# Patient Record
Sex: FEMALE | Race: WHITE | NOT HISPANIC OR LATINO | Employment: FULL TIME | URBAN - METROPOLITAN AREA
[De-identification: names, ages, dates, MRNs, and addresses within clinical notes are randomized per-mention and may not be internally consistent; named-entity substitution may affect disease eponyms.]

---

## 2021-08-02 ENCOUNTER — TRANSFERRED RECORDS (OUTPATIENT)
Dept: HEALTH INFORMATION MANAGEMENT | Facility: CLINIC | Age: 46
End: 2021-08-02

## 2021-08-02 LAB — EJECTION FRACTION: NORMAL %

## 2021-09-30 ENCOUNTER — TRANSFERRED RECORDS (OUTPATIENT)
Dept: HEALTH INFORMATION MANAGEMENT | Facility: CLINIC | Age: 46
End: 2021-09-30

## 2023-04-01 ENCOUNTER — TRANSFERRED RECORDS (OUTPATIENT)
Dept: MULTI SPECIALTY CLINIC | Facility: CLINIC | Age: 48
End: 2023-04-01

## 2024-01-26 ENCOUNTER — OFFICE VISIT (OUTPATIENT)
Dept: FAMILY MEDICINE | Facility: CLINIC | Age: 49
End: 2024-01-26
Payer: COMMERCIAL

## 2024-01-26 ENCOUNTER — TELEPHONE (OUTPATIENT)
Dept: CARDIOLOGY | Facility: CLINIC | Age: 49
End: 2024-01-26

## 2024-01-26 VITALS
HEIGHT: 64 IN | DIASTOLIC BLOOD PRESSURE: 86 MMHG | OXYGEN SATURATION: 96 % | SYSTOLIC BLOOD PRESSURE: 136 MMHG | HEART RATE: 76 BPM | WEIGHT: 178 LBS | BODY MASS INDEX: 30.39 KG/M2 | RESPIRATION RATE: 16 BRPM | TEMPERATURE: 97.5 F

## 2024-01-26 DIAGNOSIS — I42.1 HOCM (HYPERTROPHIC OBSTRUCTIVE CARDIOMYOPATHY) (H): Primary | ICD-10-CM

## 2024-01-26 DIAGNOSIS — H40.9 GLAUCOMA OF BOTH EYES, UNSPECIFIED GLAUCOMA TYPE: ICD-10-CM

## 2024-01-26 DIAGNOSIS — Z00.00 ROUTINE GENERAL MEDICAL EXAMINATION AT A HEALTH CARE FACILITY: Primary | ICD-10-CM

## 2024-01-26 DIAGNOSIS — E03.9 HYPOTHYROIDISM, UNSPECIFIED TYPE: ICD-10-CM

## 2024-01-26 DIAGNOSIS — I42.1 HOCM (HYPERTROPHIC OBSTRUCTIVE CARDIOMYOPATHY) (H): ICD-10-CM

## 2024-01-26 DIAGNOSIS — Z12.11 COLON CANCER SCREENING: ICD-10-CM

## 2024-01-26 DIAGNOSIS — Z12.31 ENCOUNTER FOR SCREENING MAMMOGRAM FOR BREAST CANCER: ICD-10-CM

## 2024-01-26 LAB
ALBUMIN SERPL BCG-MCNC: 4.3 G/DL (ref 3.5–5.2)
ALP SERPL-CCNC: 79 U/L (ref 40–150)
ALT SERPL W P-5'-P-CCNC: 16 U/L (ref 0–50)
ANION GAP SERPL CALCULATED.3IONS-SCNC: 8 MMOL/L (ref 7–15)
AST SERPL W P-5'-P-CCNC: 21 U/L (ref 0–45)
BASOPHILS # BLD AUTO: 0 10E3/UL (ref 0–0.2)
BASOPHILS NFR BLD AUTO: 1 %
BILIRUB SERPL-MCNC: 0.3 MG/DL
BUN SERPL-MCNC: 21.7 MG/DL (ref 6–20)
CALCIUM SERPL-MCNC: 9.5 MG/DL (ref 8.6–10)
CHLORIDE SERPL-SCNC: 108 MMOL/L (ref 98–107)
CHOLEST SERPL-MCNC: 239 MG/DL
CREAT SERPL-MCNC: 0.86 MG/DL (ref 0.51–0.95)
DEPRECATED HCO3 PLAS-SCNC: 27 MMOL/L (ref 22–29)
EGFRCR SERPLBLD CKD-EPI 2021: 83 ML/MIN/1.73M2
EOSINOPHIL # BLD AUTO: 0.1 10E3/UL (ref 0–0.7)
EOSINOPHIL NFR BLD AUTO: 2 %
ERYTHROCYTE [DISTWIDTH] IN BLOOD BY AUTOMATED COUNT: 12.8 % (ref 10–15)
FASTING STATUS PATIENT QL REPORTED: YES
GLUCOSE SERPL-MCNC: 92 MG/DL (ref 70–99)
HBA1C MFR BLD: 5.1 % (ref 0–5.6)
HCT VFR BLD AUTO: 44.9 % (ref 35–47)
HDLC SERPL-MCNC: 63 MG/DL
HGB BLD-MCNC: 15 G/DL (ref 11.7–15.7)
IMM GRANULOCYTES # BLD: 0 10E3/UL
IMM GRANULOCYTES NFR BLD: 0 %
LDLC SERPL CALC-MCNC: 153 MG/DL
LYMPHOCYTES # BLD AUTO: 1.6 10E3/UL (ref 0.8–5.3)
LYMPHOCYTES NFR BLD AUTO: 28 %
MCH RBC QN AUTO: 30.2 PG (ref 26.5–33)
MCHC RBC AUTO-ENTMCNC: 33.4 G/DL (ref 31.5–36.5)
MCV RBC AUTO: 90 FL (ref 78–100)
MONOCYTES # BLD AUTO: 0.4 10E3/UL (ref 0–1.3)
MONOCYTES NFR BLD AUTO: 7 %
NEUTROPHILS # BLD AUTO: 3.4 10E3/UL (ref 1.6–8.3)
NEUTROPHILS NFR BLD AUTO: 62 %
NONHDLC SERPL-MCNC: 176 MG/DL
PLATELET # BLD AUTO: 242 10E3/UL (ref 150–450)
POTASSIUM SERPL-SCNC: 4.9 MMOL/L (ref 3.4–5.3)
PROT SERPL-MCNC: 7.2 G/DL (ref 6.4–8.3)
RBC # BLD AUTO: 4.97 10E6/UL (ref 3.8–5.2)
SODIUM SERPL-SCNC: 143 MMOL/L (ref 135–145)
T4 FREE SERPL-MCNC: 0.9 NG/DL (ref 0.9–1.7)
TRIGL SERPL-MCNC: 115 MG/DL
TSH SERPL DL<=0.005 MIU/L-ACNC: 6.16 UIU/ML (ref 0.3–4.2)
WBC # BLD AUTO: 5.5 10E3/UL (ref 4–11)

## 2024-01-26 PROCEDURE — 84443 ASSAY THYROID STIM HORMONE: CPT | Performed by: FAMILY MEDICINE

## 2024-01-26 PROCEDURE — 80061 LIPID PANEL: CPT | Performed by: FAMILY MEDICINE

## 2024-01-26 PROCEDURE — 36415 COLL VENOUS BLD VENIPUNCTURE: CPT | Performed by: FAMILY MEDICINE

## 2024-01-26 PROCEDURE — 99386 PREV VISIT NEW AGE 40-64: CPT | Performed by: FAMILY MEDICINE

## 2024-01-26 PROCEDURE — 85025 COMPLETE CBC W/AUTO DIFF WBC: CPT | Performed by: FAMILY MEDICINE

## 2024-01-26 PROCEDURE — 99213 OFFICE O/P EST LOW 20 MIN: CPT | Mod: 25 | Performed by: FAMILY MEDICINE

## 2024-01-26 PROCEDURE — 80053 COMPREHEN METABOLIC PANEL: CPT | Performed by: FAMILY MEDICINE

## 2024-01-26 PROCEDURE — 84439 ASSAY OF FREE THYROXINE: CPT | Performed by: FAMILY MEDICINE

## 2024-01-26 PROCEDURE — 83036 HEMOGLOBIN GLYCOSYLATED A1C: CPT | Performed by: FAMILY MEDICINE

## 2024-01-26 RX ORDER — LATANOPROST 50 UG/ML
1 SOLUTION/ DROPS OPHTHALMIC
COMMUNITY
Start: 2022-04-27

## 2024-01-26 RX ORDER — CHLORAL HYDRATE 500 MG
CAPSULE ORAL 2 TIMES DAILY
COMMUNITY

## 2024-01-26 RX ORDER — BUSPIRONE HYDROCHLORIDE 15 MG/1
15 TABLET ORAL
COMMUNITY
Start: 2023-07-10 | End: 2024-04-15

## 2024-01-26 RX ORDER — BUPROPION HYDROCHLORIDE 300 MG/1
TABLET ORAL
COMMUNITY
Start: 2023-12-29 | End: 2024-04-15

## 2024-01-26 RX ORDER — MULTIVITAMIN
1 TABLET ORAL DAILY
COMMUNITY

## 2024-01-26 RX ORDER — CETIRIZINE HYDROCHLORIDE 10 MG/1
TABLET ORAL
COMMUNITY

## 2024-01-26 RX ORDER — LEVOTHYROXINE SODIUM 88 UG/1
TABLET ORAL
COMMUNITY
Start: 2022-08-15 | End: 2024-01-26

## 2024-01-26 RX ORDER — LEVOTHYROXINE SODIUM 88 UG/1
TABLET ORAL
Qty: 90 TABLET | Refills: 1 | Status: SHIPPED | OUTPATIENT
Start: 2024-01-26 | End: 2024-08-28

## 2024-01-26 RX ORDER — VERAPAMIL HYDROCHLORIDE 120 MG/1
1 TABLET, FILM COATED, EXTENDED RELEASE ORAL AT BEDTIME
COMMUNITY
Start: 2022-09-12 | End: 2024-04-15

## 2024-01-26 RX ORDER — OMEGA-3S/DHA/EPA/FISH OIL/D3 300MG-1000
10 CAPSULE ORAL
COMMUNITY

## 2024-01-26 RX ORDER — ASCORBIC ACID 125 MG
TABLET,CHEWABLE ORAL
COMMUNITY

## 2024-01-26 ASSESSMENT — ENCOUNTER SYMPTOMS
HEADACHES: 0
DIARRHEA: 0
EYE PAIN: 0
CONSTIPATION: 0
PARESTHESIAS: 0
ABDOMINAL PAIN: 0
WEAKNESS: 0
HEMATURIA: 0
COUGH: 0
CHILLS: 0
HEMATOCHEZIA: 0
DYSURIA: 0
PALPITATIONS: 0
FEVER: 0
ARTHRALGIAS: 0
NAUSEA: 0
SORE THROAT: 0
JOINT SWELLING: 0
NERVOUS/ANXIOUS: 0
DIZZINESS: 0
HEARTBURN: 0
FREQUENCY: 0
MYALGIAS: 0
SHORTNESS OF BREATH: 0

## 2024-01-26 ASSESSMENT — PAIN SCALES - GENERAL: PAINLEVEL: NO PAIN (0)

## 2024-01-26 NOTE — TELEPHONE ENCOUNTER
[facility] Call Center    Phone Message    May a detailed message be left on voicemail: no     Reason for Call: Appointment Intake    Referring Provider Name: Kenya Cain MD in [facility]     Diagnosis and/or Symptoms:     HOCM (hypertrophic obstructive cardiomyopathy)     Please call pt to schedule.     Action Taken: Message routed to:  [facility] cardio    Travel Screening: Not Applicable

## 2024-01-26 NOTE — PROGRESS NOTES
"Preventive Care Visit  [facility]  Kenya Cain MD, Family Medicine  Jan 26, 2024       SUBJECTIVE:   Lawanda is a 48 year old, presenting for the following:  Physical        1/26/2024     6:59 AM   Additional Questions   Roomed by Luisa LORA   Accompanied by Self     Healthy Habits:     Getting at least 3 servings of Calcium per day:  Yes    Bi-annual eye exam:  Yes    Dental care twice a year:  Yes    Sleep apnea or symptoms of sleep apnea:  None    Diet:  Carbohydrate counting    Frequency of exercise:  2-3 days/week    Duration of exercise:  15-30 minutes    Taking medications regularly:  Yes    Medication side effects:  None    Additional concerns today:  Yes    Preventive -     Immunization History   Administered Date(s) Administered    TDAP (Adacel,Boostrix) 12/04/2013       -Mammogram: had mammogram in 05/2023   Next 05/2024   Referred    -Contraception: had tubal ligation     -PAP:   Used to see obgyn   More than 5 years  No hx of abnormal pap  She will schedule Pap at a later time.    No results found for: \"PAP\"    -DEXA: at age 65     - Colon CA screen: Colonoscopy, age 45-75 every 10 years or FIT every year or Cologuard every 3 years     Never had colonoscopy   Referred           -lipids screen: ordered     Diabetes screen: ordered              Obstructive hypertrophic cardiomyopathy (HCC)   Currently taking verapamil CR (Calan SR) 120 MG   Follows with Cardiology     Anxiety   Depression   Wellbutrin 300 mg daily   Buspar 15 mg 2 times daily     Glaucoma   Follows with ophthalmology   Had glaucoma surgery      Hypothyroidism   Synthroid 88 mcg     Wt Readings from Last 4 Encounters:   01/26/24 80.7 kg (178 lb)        Migraine without aura and without status migrainosus, not intractable   Nurtec 75 mg for preventive , has not been taking -  Imitrex 25 mg  as needed   Follows with nueology         Today's PHQ-2 Score:       1/26/2024     6:57 AM   PHQ-2 ( 1999 [employer])   Q1: " Little interest or pleasure in doing things 0   Q2: Feeling down, depressed or hopeless 1   PHQ-2 Score 1   Q1: Little interest or pleasure in doing things Not at all   Q2: Feeling down, depressed or hopeless Several days   PHQ-2 Score 1           Via the Health Maintenance questionnaire, the patient has reported the following services have been completed -Mammogram, this information has been sent to the abstraction team.        Have you ever done Advance Care Planning? (For example, a Health Directive, POLST, or a discussion with a medical provider or your loved ones about your wishes): No, advance care planning information given to patient to review.  Patient plans to discuss their wishes with loved ones or provider.      Social History     Tobacco Use    Smoking status: Never    Smokeless tobacco: Never   Substance Use Topics    Alcohol use: Not on file             2024     6:56 AM   Alcohol Use   Prescreen: >3 drinks/day or >7 drinks/week? Not Applicable       SH:    Marital status:    Kids: 2  Employment:    Exercise: walking 5 days per week   Tobacco: no   Etoh: no   Recreational drugs: no   Caffeine: coffee   Moved for [place] in  to [place] , now moved to [place]       Reviewed orders with patient.  Reviewed health maintenance and updated orders accordingly - Yes  Lab work is in process  Labs reviewed in EPIC  BP Readings from Last 3 Encounters:   24 136/86    Wt Readings from Last 3 Encounters:   24 80.7 kg (178 lb)                  There is no problem list on file for this patient.    Past Surgical History:   Procedure Laterality Date    breast reduction       SECTION      CHOLECYSTECTOMY      ELBOW SURGERY      LAPAROSCOPIC GASTRIC SLEEVE         Social History     Tobacco Use    Smoking status: Never    Smokeless tobacco: Never   Substance Use Topics    Alcohol use: Not on file     Family History   Problem Relation Age of Onset    Breast Cancer  Mother     Lung Cancer Mother     Kidney Disease Father          Current Outpatient Medications   Medication Sig Dispense Refill    buPROPion (WELLBUTRIN XL) 300 MG 24 hr tablet TAKE 1 TABLET BY MOUTH DAILY FOR ANXIETY WITH DEPRESSION OR MAJOR DEPRESSION      busPIRone (BUSPAR) 15 MG tablet Take 15 mg by mouth      cetirizine (ZYRTEC) 10 MG tablet Take by mouth 1 time per day      Cyanocobalamin (B-12) 5000 MCG CAPS       fish oil-omega-3 fatty acids 1000 MG capsule Take  by mouth.      latanoprost (XALATAN) 0.005 % ophthalmic solution Apply 1 drop to eye      levothyroxine (SYNTHROID/LEVOTHROID) 88 MCG tablet TAKE 1 TABLET BY MOUTH DAILY FOR HYPOTHYROIDISM 90 tablet 1    multivitamin w/minerals (MULTI-VITAMIN) tablet Take 1 tablet by mouth daily      verapamil ER (CALAN-SR) 120 MG CR tablet Take 1 tablet by mouth at bedtime      Vitamin D3 (VITAMIN D) 10 MCG (400 UNIT) tablet Take 10 mcg by mouth       Allergies   Allergen Reactions    Bee Venom Hives and Unknown    Benzoin Hives, Other (See Comments) and Rash     Blisters    Insect Extract Hives     Allergic to Bee's.    Phentermine-Topiramate Other (See Comments)     CP24   Parethesia    CP24    Parethesia    Adhesive Tape Blisters and Other (See Comments)     Liquid adhesive    Wound Dressing Adhesive      Liquid adhesive    MISC    Seasonal Allergies Other (See Comments)     Sinus issues and eyes itch and burn     Recent Labs   Lab Test 01/26/24  0741   A1C 5.1        Breast Cancer Screening:  Any new diagnosis of family breast, ovarian, or bowel cancer? Yes       FHS-7:       1/26/2024     6:59 AM   Breast CA Risk Assessment (FHS-7)   Did any of your first-degree relatives have breast or ovarian cancer? Yes   Did any of your relatives have bilateral breast cancer? Unknown   Did any man in your family have breast cancer? No   Did any woman in your family have breast and ovarian cancer? Yes   Did any woman in your family have breast cancer before age 50 y? No  "  Do you have 2 or more relatives with breast and/or ovarian cancer? Unknown   Do you have 2 or more relatives with breast and/or bowel cancer? No     click delete button to remove this line now  Mammogram Screening: Recommended annual mammography  Pertinent mammograms are reviewed under the imaging tab.    History of abnormal Pap smear: NO - age 30-65 PAP every 5 years with negative HPV co-testing recommended     Reviewed and updated as needed this visit by clinical staff   Tobacco  Allergies  Meds   Med Hx  Surg Hx  Fam Hx          Reviewed and updated as needed this visit by Provider     Meds   Med Hx  Surg Hx  Fam Hx          Past Medical History:   Diagnosis Date    Anxiety and depression     HOCM (hypertrophic obstructive cardiomyopathy) (H)     Hypothyroidism     Migraine       Past Surgical History:   Procedure Laterality Date    breast reduction       SECTION      CHOLECYSTECTOMY      ELBOW SURGERY      LAPAROSCOPIC GASTRIC SLEEVE       OB History   No obstetric history on file.     Review of Systems   Constitutional:  Negative for chills and fever.   HENT:  Negative for congestion, ear pain, hearing loss and sore throat.    Eyes:  Negative for pain and visual disturbance.   Respiratory:  Negative for cough and shortness of breath.    Cardiovascular:  Negative for chest pain and palpitations.   Gastrointestinal:  Negative for abdominal pain, constipation, diarrhea and nausea.   Genitourinary:  Negative for dysuria, frequency, genital sores, hematuria and urgency.   Musculoskeletal:  Negative for arthralgias, joint swelling and myalgias.   Skin:  Negative for rash.   Neurological:  Negative for dizziness, weakness and headaches.   Psychiatric/Behavioral:  The patient is not nervous/anxious.          OBJECTIVE:   /86   Pulse 76   Temp 97.5  F (36.4  C) (Tympanic)   Resp 16   Ht 1.626 m (5' 4\")   Wt 80.7 kg (178 lb)   LMP  (LMP Unknown)   SpO2 96%   BMI 30.55 kg/m     Estimated " "body mass index is 30.55 kg/m  as calculated from the following:    Height as of this encounter: 1.626 m (5' 4\").    Weight as of this encounter: 80.7 kg (178 lb).  Physical Exam  GENERAL: alert and no distress  NECK: no adenopathy, no asymmetry, masses, or scars  RESP: lungs clear to auscultation - no rales, rhonchi or wheezes  CV: regular rate and rhythm, normal S1 S2, no S3 or S4, no murmur, click or rub, no peripheral edema  ABDOMEN: soft, nontender, no hepatosplenomegaly, no masses and bowel sounds normal  MS: no gross musculoskeletal defects noted, no edema        ASSESSMENT/PLAN:   Routine general medical examination at a health care facility  New to our clinic - here to establish care   Preventive care reviewed and updated.    - Lipid panel reflex to direct LDL Non-fasting; Future  - Hemoglobin A1c; Future  - Comprehensive metabolic panel; Future  - CBC with Platelets & Differential; Future  - Lipid panel reflex to direct LDL Non-fasting  - Hemoglobin A1c  - Comprehensive metabolic panel  - CBC with Platelets & Differential    Hypothyroidism, unspecified type  History of hypothyroidism, currently on Synthroid 88 mcg.  No current symptoms.  Continue the same treatment, refill provided.  Check TSH today.  - TSH with free T4 reflex; Future  - levothyroxine (SYNTHROID/LEVOTHROID) 88 MCG tablet; TAKE 1 TABLET BY MOUTH DAILY FOR HYPOTHYROIDISM  - TSH with free T4 reflex    HOCM (hypertrophic obstructive cardiomyopathy) (H)  Patient reports she is feeling at baseline.  She denies any shortness of breath or chest pain.  She would like to establish care with a local cardiologist in the area.  Referral placed.  Continue the same treatment for now verapamil 120 mg daily  - Adult Cardiology Eval  Referral; Future    Glaucoma of both eyes, unspecified glaucoma type  She would like to establish care with an eye doctor, referral placed.  - Adult Eye  Referral; Future    Encounter for screening mammogram " for breast cancer    - *MA Screening Digital Bilateral; Future    Colon cancer screening    - Colonoscopy Screening  Referral; Future    Patient has been advised of split billing requirements and indicates understanding: Yes      Counseling  Reviewed preventive health counseling, as reflected in patient instructions       Regular exercise       Healthy diet/nutrition       Colorectal Cancer Screening        She reports that she has never smoked. She has never used smokeless tobacco.          Signed Electronically by: Kenya Cain MD

## 2024-01-31 ENCOUNTER — TELEPHONE (OUTPATIENT)
Dept: GASTROENTEROLOGY | Facility: CLINIC | Age: 49
End: 2024-01-31
Payer: COMMERCIAL

## 2024-01-31 ENCOUNTER — HOSPITAL ENCOUNTER (OUTPATIENT)
Facility: AMBULATORY SURGERY CENTER | Age: 49
End: 2024-01-31
Attending: SURGERY | Admitting: SURGERY
Payer: COMMERCIAL

## 2024-01-31 NOTE — TELEPHONE ENCOUNTER
"Endoscopy Scheduling Screen    Have you had a positive Covid test in the last 14 days?  No    Are you active on MyChart?   Yes    What insurance is in the chart?  Other:  blue plus    Ordering/Referring Provider: RACHEL SANDERS    (If ordering provider performs procedure, schedule with ordering provider unless otherwise instructed. )    BMI: Estimated body mass index is 30.55 kg/m  as calculated from the following:    Height as of 1/26/24: 1.626 m (5' 4\").    Weight as of 1/26/24: 80.7 kg (178 lb).     Sedation Ordered  moderate sedation.   If patient BMI > 50 do not schedule in ASC.    If patient BMI > 45 do not schedule at [facility].    Are you taking methadone or Suboxone?  No    Have you had difficulties, pain, or discomfort during past endoscopy procedures?  No    Are you taking any prescription medications for pain 3 or more times per week?   NO - No RN review required.    Do you have a history of malignant hyperthermia or adverse reaction to anesthesia?  No    (Females) Are you currently pregnant?   No     Have you been diagnosed or told you have pulmonary hypertension?   No    Do you have an LVAD?  No    Have you been told you have moderate to severe sleep apnea?  No    Have you been told you have COPD, asthma, or any other lung disease?  No    Do you have any heart conditions?  Yes     In the past year, have you had any hospitalizations for heart related issues including cardiomyopathy, heart attack, or stent placement?  No    Do you have any implantable devices in your body (pacemaker, ICD)?  No    Do you take nitroglycerine?  No    Have you ever had an organ transplant?   No    Have you ever had or are you awaiting a heart or lung transplant?   No    Have you had a stroke or transient ischemic attack (TIA aka \"mini stroke\" in the last 6 months?   No    Have you been diagnosed with or been told you have cirrhosis of the liver?   No    Are you currently on dialysis?   No    Do you need assistance " "transferring?   No    BMI: Estimated body mass index is 30.55 kg/m  as calculated from the following:    Height as of 1/26/24: 1.626 m (5' 4\").    Weight as of 1/26/24: 80.7 kg (178 lb).     Is patients BMI > 40 and scheduling location [facility]?  No    Do you take an injectable medication for weight loss or diabetes (excluding insulin)?  No    Do you take the medication Naltrexone?  No    Do you take blood thinners?  No       Prep   Are you currently on dialysis or do you have chronic kidney disease?  No    Do you have a diagnosis of diabetes?  No    Do you have a diagnosis of cystic fibrosis (CF)?  No    On a regular basis do you go 3 -5 days between bowel movements?  No    BMI > 40?  No    Preferred Pharmacy:    [id number] - [place] - [id number]  AT [place] [address] [place] [id number]  Phone: [phone] Fax: [phone]      Final Scheduling Details   Colonoscopy prep sent?  Standard MiraLAX    Procedure scheduled  Colonoscopy    Surgeon:  arcadio     Date of procedure:  4/19/24     Pre-OP / PAC:   No - Not required for this site.    Location  [facility] - Per order.    Sedation   Moderate Sedation - Per order.      Patient Reminders:   You will receive a call from a Nurse to review instructions and health history.  This assessment must be completed prior to your procedure.  Failure to complete the Nurse assessment may result in the procedure being cancelled.      On the day of your procedure, please designate an adult(s) who can drive you home stay with you for the next 24 hours. The medicines used in the exam will make you sleepy. You will not be able to drive.      You cannot take public transportation, ride share services, or non-medical taxi service without a responsible caregiver.  Medical transport services are allowed with the requirement that a responsible caregiver will receive you at your destination.  We require that drivers " and caregivers are confirmed prior to your procedure.

## 2024-02-05 ENCOUNTER — TELEPHONE (OUTPATIENT)
Dept: GASTROENTEROLOGY | Facility: CLINIC | Age: 49
End: 2024-02-05
Payer: COMMERCIAL

## 2024-02-05 NOTE — TELEPHONE ENCOUNTER
Caller: Lawanda    Reason for Reschedule/Cancellation (please be detailed, any staff messages or encounters to note?): Needs new date due to ride availability       Prior to reschedule please review:  Ordering Provider: RACHEL SANDERS   Sedation Determined: Moderate  Does patient have any ASC Exclusions, please identify?: No      Notes on Cancelled Procedure:  Procedure: Lower Endoscopy [Colonoscopy]   Date: 04/19/2024  Location: [facility]; [address], [place] [address]  Surgeon: EDWARD      Rescheduled: Yes  Procedure: Lower Endoscopy [Colonoscopy]   Date: 04/12/2024  Location: [facility][note 3]; [address] , [place] [address]  Surgeon: BYRON  Sedation Level Scheduled  MAC,  Reason for Sedation Level Per Location  Prep/Instructions updated and sent: Yes, Carlita

## 2024-02-06 NOTE — TELEPHONE ENCOUNTER
Date: 2/6/2024    Time of Call: 1:42 PM     Diagnosis:  HOCM     [ TORB ] Ordering provider: Dr. Garcia  Order: Establish care with Dr. Garcia with echo and 14 day Ziopatch monitor prior      Order received by: John Kelly RN      Follow-up/additional notes: Called and left voicemail for patient to return call regarding new referral for [facility]. Sent to scheduling.       Referred by Kenya Briggs   Clinic Notes from 1/26/24  ASSESSMENT/PLAN:   Routine general medical examination at a health care facility  New to our clinic - here to establish care   Preventive care reviewed and updated.     - Lipid panel reflex to direct LDL Non-fasting; Future  - Hemoglobin A1c; Future  - Comprehensive metabolic panel; Future  - CBC with Platelets & Differential; Future  - Lipid panel reflex to direct LDL Non-fasting  - Hemoglobin A1c  - Comprehensive metabolic panel  - CBC with Platelets & Differential     Hypothyroidism, unspecified type  History of hypothyroidism, currently on Synthroid 88 mcg.  No current symptoms.  Continue the same treatment, refill provided.  Check TSH today.  - TSH with free T4 reflex; Future  - levothyroxine (SYNTHROID/LEVOTHROID) 88 MCG tablet; TAKE 1 TABLET BY MOUTH DAILY FOR HYPOTHYROIDISM  - TSH with free T4 reflex     HOCM (hypertrophic obstructive cardiomyopathy) (H)  Patient reports she is feeling at baseline.  She denies any shortness of breath or chest pain.  She would like to establish care with a local cardiologist in the area.  Referral placed.  Continue the same treatment for now verapamil 120 mg daily  - Adult Cardiology Eval  Referral; Future     Glaucoma of both eyes, unspecified glaucoma type  She would like to establish care with an eye doctor, referral placed.  - Adult Eye  Referral; Future     Encounter for screening mammogram for breast cancer     - *MA Screening Digital Bilateral; Future     Colon cancer screening     - Colonoscopy Screening   Referral; Future    Prior Testing:   *Stress Echo on 6/22/23 - Images requested   Interpretation Summary   Stress echocardiogram obtained for hypertrophic cardiomyopathy.     Significant inducible LVOT gradient post-exercise was noted.   The resting left ventricular outflow peak gradient is 13 mm Hg. (Peak V : 1.8 m/sec)   The inducible left ventricular outflow peak gradient (Valsalva) is 60 mm Hg. (Peak V : 3.9 m/sec)   The inducible left ventricular outflow peak gradient (post-exercise) is 171 mmHg (Peak V : 6.5 m/sec)   No regional wall motion abnormalities and normal hyperdynamic function noted with stress.   Average exercise tolerance (exercised for 7:32 min to Stage III of the Holger protocol, achieving 8 METS) and stopped due to fatigue.   Baseline EKG with normal sinus rhythm and no diagnostic ST changes with stress.   Kendrick treadmill score +7 (low risk).     Baseline limited echo demonstrates moderate asymmetric LVH. Septal thickness is 1.5 cm.   There is evidence of LVOT obstruction due to systolic anterior motion of the mitral valve.   Trace mitral regurgitation noted.   Resting and inducible LVOT gradients as noted above.   No prior for comparison.     *Cardiac MRI from 9/30/21-  Images requested   IMPRESSION:    1.  Hypertrophic obstructive cardiomyopathy.  The basal anteroseptal wall measures up to 15 mm.  There is systolic anterior motion of the mitral valve.  There is papillary muscle hypertrophy.   2. No late gadolinium enhancement seen.    3. No right ventricular cavity size, wall thickness and global systolic function.  Calculated RVEF: 63%.   4. Small hiatal hernia.     *Echo 8/2/21 - Images requsted  There is moderate concentric left ventricular hypertrophy.   Considering 2D visualization and technical calculations the left ventricular ejection   fraction estimate is 60-65%.   No regional wall motion abnormalities noted.   There is no evidence of aortic stenosis.   The inferior vena cava is normal  in size with respiratory collapse, indicating a right   atrial pressure of approximately 3 mmHg.   There is no evidence of a pericardial effusion.   Dynamic LVOT gradient is noted, up to 70mmHg of mercury with valsalva. There is systolic   anterior motion of the mitral chordal apparatus.

## 2024-02-07 ENCOUNTER — DOCUMENTATION ONLY (OUTPATIENT)
Dept: CARDIOLOGY | Facility: CLINIC | Age: 49
End: 2024-02-07
Payer: COMMERCIAL

## 2024-02-07 NOTE — PROGRESS NOTES
Action 2-7   [facility] Requested:   Action Taken Stress ECHO Images 6-22-23      2-9 Images in PACS     Action 2-7 [facility] Requested:  Tracking # [id number]   Action Taken Cardiac MRI Images  ECHO Images 9-30-21 8-2-21      2-9 Request re-processed: [facility] Pushed to [employer] but [place] does not use this program. Request confirmed with [facility] radiology to send disk like originally requested.    Received disc with cMRI and echo. Sent to 4 N to be uploaded to PACS 2/14 CJ

## 2024-03-08 NOTE — TELEPHONE ENCOUNTER
Pt called LVM asking if she has to repeat a zio. Pt stated she did one a while back and she is highly allergic to the adhesive. Pt would like a call back to discuss.

## 2024-03-13 ENCOUNTER — TELEPHONE (OUTPATIENT)
Dept: CARDIOLOGY | Facility: CLINIC | Age: 49
End: 2024-03-13
Payer: COMMERCIAL

## 2024-03-13 DIAGNOSIS — I42.1 HOCM (HYPERTROPHIC OBSTRUCTIVE CARDIOMYOPATHY) (H): Primary | ICD-10-CM

## 2024-03-13 NOTE — TELEPHONE ENCOUNTER
Pt called RN with information on a heart monitor she had worn a year ago. She stated that the monitor information was viewable in her chart.

## 2024-03-13 NOTE — TELEPHONE ENCOUNTER
Called and LVM for patient to call back to discuss alternative options for a heart monitor with hypoallergenic adhesive.

## 2024-03-29 ENCOUNTER — TELEPHONE (OUTPATIENT)
Dept: GASTROENTEROLOGY | Facility: CLINIC | Age: 49
End: 2024-03-29
Payer: COMMERCIAL

## 2024-03-29 NOTE — TELEPHONE ENCOUNTER
Caller: Lawanda    Reason for Reschedule/Cancellation   (please be detailed, any staff messages or encounters to note?): Covid      Prior to reschedule please review:  Ordering Provider: Kenya Cain  Sedation Determined: MAC  Does patient have any ASC Exclusions, please identify?: N      Notes on Cancelled Procedure:  Procedure: Lower Endoscopy [Colonoscopy]   Date: 4/12/24  Location: [facility] [place] [facility]; [address] [place] [address]  Surgeon: Matt      Rescheduled: Yes,   Procedure: Lower Endoscopy [Colonoscopy]    Date: 5/24/24   Location: [facility] [place] [facility]; [address] [place] [address] [place] [address]   Surgeon: Matt   Sedation Level Scheduled  MAC ,  Reason for Sedation Level Location   Instructions updated and sent: Princess     Does patient need PAC or Pre -Op Rescheduled? : N       Did you cancel or rescheduled an EUS procedure? No.

## 2024-04-09 ENCOUNTER — HOSPITAL ENCOUNTER (OUTPATIENT)
Dept: CARDIOLOGY | Facility: CLINIC | Age: 49
Discharge: HOME | End: 2024-04-09
Attending: INTERNAL MEDICINE | Admitting: INTERNAL MEDICINE
Payer: COMMERCIAL

## 2024-04-09 DIAGNOSIS — I42.1 HOCM (HYPERTROPHIC OBSTRUCTIVE CARDIOMYOPATHY) (H): ICD-10-CM

## 2024-04-09 LAB — LVEF ECHO: NORMAL

## 2024-04-09 PROCEDURE — 93306 TTE W/DOPPLER COMPLETE: CPT | Mod: 26 | Performed by: INTERNAL MEDICINE

## 2024-04-09 PROCEDURE — 93306 TTE W/DOPPLER COMPLETE: CPT

## 2024-04-14 ENCOUNTER — MYC MEDICAL ADVICE (OUTPATIENT)
Dept: FAMILY MEDICINE | Facility: CLINIC | Age: 49
End: 2024-04-14
Payer: COMMERCIAL

## 2024-04-14 DIAGNOSIS — F41.9 ANXIETY: ICD-10-CM

## 2024-04-14 DIAGNOSIS — I42.1 HOCM (HYPERTROPHIC OBSTRUCTIVE CARDIOMYOPATHY) (H): Primary | ICD-10-CM

## 2024-04-14 DIAGNOSIS — F32.A DEPRESSION, UNSPECIFIED DEPRESSION TYPE: ICD-10-CM

## 2024-04-17 RX ORDER — BUSPIRONE HYDROCHLORIDE 15 MG/1
15 TABLET ORAL 2 TIMES DAILY
Qty: 180 TABLET | Refills: 0 | Status: SHIPPED | OUTPATIENT
Start: 2024-04-17 | End: 2024-08-28

## 2024-04-17 RX ORDER — BUPROPION HYDROCHLORIDE 300 MG/1
TABLET ORAL
Qty: 90 TABLET | Refills: 0 | Status: SHIPPED | OUTPATIENT
Start: 2024-04-17 | End: 2024-06-18

## 2024-04-17 RX ORDER — VERAPAMIL HYDROCHLORIDE 120 MG/1
120 TABLET, FILM COATED, EXTENDED RELEASE ORAL AT BEDTIME
Qty: 90 TABLET | Refills: 0 | Status: SHIPPED | OUTPATIENT
Start: 2024-04-17 | End: 2024-07-25

## 2024-05-23 NOTE — H&P
[facility] History and Physical    Lawanda Kemp MRN# [id number]   Age: 48 year old YOB: 1975     Date of Admission:  (Not on file)    Home clinic: [facility]  Primary care provider: No Ref-Primary, Physician          Impression and Plan:   Impression:   Colon cancer screening [Z12.11]  No prior in system      Plan:   Proceed to Colonoscopy as planned.  The procedure, risks(bleeding, perforation), benefits and alternatives were discussed and the patient agrees to proceed. Cleared for Anesthesia             Chief Complaint:   Colon cancer screening [Z12.11]    History is obtained from the patient          History of Present Illness:   This 48 year old female is being seen at this time for evaluation for colonoscopy. No complaints no family hx           Past Medical History:     Past Medical History:   Diagnosis Date    Anxiety and depression     HOCM (hypertrophic obstructive cardiomyopathy) (H)     Hypothyroidism     Migraine             Past Surgical History:     Past Surgical History:   Procedure Laterality Date    breast reduction       SECTION      CHOLECYSTECTOMY      ELBOW SURGERY      LAPAROSCOPIC GASTRIC SLEEVE              Social History:     Social History     Tobacco Use    Smoking status: Never    Smokeless tobacco: Never   Substance Use Topics    Alcohol use: Not on file            Family History:     Family History   Problem Relation Age of Onset    Breast Cancer Mother     Lung Cancer Mother     Kidney Disease Father             Immunizations:     VACCINE/DOSE   Diptheria   DPT   DTAP   HBIG   Hepatitis A   Hepatitis B   HIB   Influenza   Measles   Meningococcal   MMR   Mumps   Pneumococcal   Polio   Rubella   Small Pox   TDAP   Varicella   Zoster            Allergies:     Allergies   Allergen Reactions    Bee Venom Hives and Unknown    Benzoin Hives, Other (See Comments) and Rash     Blisters    Insect Extract Hives     Allergic to Bee's.     Phentermine-Topiramate Other (See Comments)     CP24   Parethesia    CP24    Parethesia    Adhesive Tape Blisters and Other (See Comments)     Liquid adhesive    Wound Dressing Adhesive      Liquid adhesive    MISC    Seasonal Allergies Other (See Comments)     Sinus issues and eyes itch and burn            Medications:     No current facility-administered medications for this encounter.     Current Outpatient Medications   Medication Sig Dispense Refill    buPROPion (WELLBUTRIN XL) 300 MG 24 hr tablet TAKE 1 TABLET BY MOUTH DAILY FOR ANXIETY WITH DEPRESSION OR MAJOR DEPRESSION 90 tablet 0    busPIRone (BUSPAR) 15 MG tablet Take 1 tablet (15 mg) by mouth 2 times daily 180 tablet 0    cetirizine (ZYRTEC) 10 MG tablet Take by mouth 1 time per day      Cyanocobalamin (B-12) 5000 MCG CAPS       fish oil-omega-3 fatty acids 1000 MG capsule Take by mouth 2 times daily      latanoprost (XALATAN) 0.005 % ophthalmic solution Apply 1 drop to eye      levothyroxine (SYNTHROID/LEVOTHROID) 88 MCG tablet TAKE 1 TABLET BY MOUTH DAILY FOR HYPOTHYROIDISM 90 tablet 1    multivitamin w/minerals (MULTI-VITAMIN) tablet Take 1 tablet by mouth daily      verapamil ER (CALAN-SR) 120 MG CR tablet Take 1 tablet (120 mg) by mouth at bedtime 90 tablet 0    Vitamin D3 (VITAMIN D) 10 MCG (400 UNIT) tablet Take 10 mcg by mouth               Review of Systems:   The review of systems was positive for the following findings.  None.  The remainder of the review of systems was unremarkable.          Physical Exam:   All vitals have been reviewed  There were no vitals taken for this visit.  No intake or output data in the 24 hours ending 05/23/24 1100  SHEENT examination revealed NC/AT, EOMI.  Examination of the chest revealed CTA.  Examination of the heart revealed RRR.  Examination of the abdomen revealed soft, non tender.  The neuromuscular examination was NL.          Data:   All laboratory data reviewed  No results found for any visits on  05/24/24.  -     Morgan Gutierrez MD, FACS

## 2024-05-24 ENCOUNTER — ANESTHESIA (OUTPATIENT)
Dept: GASTROENTEROLOGY | Facility: CLINIC | Age: 49
End: 2024-05-24
Payer: COMMERCIAL

## 2024-05-24 ENCOUNTER — ANESTHESIA EVENT (OUTPATIENT)
Dept: GASTROENTEROLOGY | Facility: CLINIC | Age: 49
End: 2024-05-24
Payer: COMMERCIAL

## 2024-05-24 ENCOUNTER — HOSPITAL ENCOUNTER (OUTPATIENT)
Facility: CLINIC | Age: 49
Discharge: HOME | End: 2024-05-24
Attending: SPECIALIST | Admitting: SPECIALIST
Payer: COMMERCIAL

## 2024-05-24 VITALS
HEART RATE: 79 BPM | SYSTOLIC BLOOD PRESSURE: 108 MMHG | RESPIRATION RATE: 16 BRPM | DIASTOLIC BLOOD PRESSURE: 94 MMHG | OXYGEN SATURATION: 100 % | TEMPERATURE: 96.7 F

## 2024-05-24 LAB — COLONOSCOPY: NORMAL

## 2024-05-24 PROCEDURE — 88305 TISSUE EXAM BY PATHOLOGIST: CPT | Mod: TC | Performed by: SPECIALIST

## 2024-05-24 PROCEDURE — 45385 COLONOSCOPY W/LESION REMOVAL: CPT | Mod: PT | Performed by: SPECIALIST

## 2024-05-24 PROCEDURE — 250N000009 HC RX 250: Performed by: NURSE ANESTHETIST, CERTIFIED REGISTERED

## 2024-05-24 PROCEDURE — 45380 COLONOSCOPY AND BIOPSY: CPT | Performed by: SPECIALIST

## 2024-05-24 PROCEDURE — 258N000003 HC RX IP 258 OP 636: Performed by: NURSE ANESTHETIST, CERTIFIED REGISTERED

## 2024-05-24 PROCEDURE — 370N000017 HC ANESTHESIA TECHNICAL FEE, PER MIN: Performed by: SPECIALIST

## 2024-05-24 PROCEDURE — 250N000011 HC RX IP 250 OP 636: Performed by: NURSE ANESTHETIST, CERTIFIED REGISTERED

## 2024-05-24 PROCEDURE — 88305 TISSUE EXAM BY PATHOLOGIST: CPT | Mod: 26 | Performed by: PATHOLOGY

## 2024-05-24 RX ORDER — SODIUM CHLORIDE, SODIUM LACTATE, POTASSIUM CHLORIDE, CALCIUM CHLORIDE 600; 310; 30; 20 MG/100ML; MG/100ML; MG/100ML; MG/100ML
INJECTION, SOLUTION INTRAVENOUS CONTINUOUS
Status: DISCONTINUED | OUTPATIENT
Start: 2024-05-24 | End: 2024-05-24 | Stop reason: HOSPADM

## 2024-05-24 RX ORDER — PROPOFOL 10 MG/ML
INJECTION, EMULSION INTRAVENOUS PRN
Status: DISCONTINUED | OUTPATIENT
Start: 2024-05-24 | End: 2024-05-24

## 2024-05-24 RX ORDER — ONDANSETRON 4 MG/1
4 TABLET, ORALLY DISINTEGRATING ORAL EVERY 30 MIN PRN
Status: DISCONTINUED | OUTPATIENT
Start: 2024-05-24 | End: 2024-05-24 | Stop reason: HOSPADM

## 2024-05-24 RX ORDER — NALOXONE HYDROCHLORIDE 0.4 MG/ML
0.1 INJECTION, SOLUTION INTRAMUSCULAR; INTRAVENOUS; SUBCUTANEOUS
Status: DISCONTINUED | OUTPATIENT
Start: 2024-05-24 | End: 2024-05-24 | Stop reason: HOSPADM

## 2024-05-24 RX ORDER — DEXAMETHASONE SODIUM PHOSPHATE 10 MG/ML
4 INJECTION, SOLUTION INTRAMUSCULAR; INTRAVENOUS
Status: DISCONTINUED | OUTPATIENT
Start: 2024-05-24 | End: 2024-05-24 | Stop reason: HOSPADM

## 2024-05-24 RX ORDER — LIDOCAINE HYDROCHLORIDE 20 MG/ML
INJECTION, SOLUTION INFILTRATION; PERINEURAL PRN
Status: DISCONTINUED | OUTPATIENT
Start: 2024-05-24 | End: 2024-05-24

## 2024-05-24 RX ORDER — LIDOCAINE 40 MG/G
CREAM TOPICAL
Status: DISCONTINUED | OUTPATIENT
Start: 2024-05-24 | End: 2024-05-24

## 2024-05-24 RX ORDER — LIDOCAINE 40 MG/G
CREAM TOPICAL
Status: DISCONTINUED | OUTPATIENT
Start: 2024-05-24 | End: 2024-05-24 | Stop reason: HOSPADM

## 2024-05-24 RX ORDER — ONDANSETRON 2 MG/ML
4 INJECTION INTRAMUSCULAR; INTRAVENOUS EVERY 30 MIN PRN
Status: DISCONTINUED | OUTPATIENT
Start: 2024-05-24 | End: 2024-05-24 | Stop reason: HOSPADM

## 2024-05-24 RX ADMIN — PROPOFOL 200 MCG/KG/MIN: 10 INJECTION, EMULSION INTRAVENOUS at 12:08

## 2024-05-24 RX ADMIN — PROPOFOL 100 MG: 10 INJECTION, EMULSION INTRAVENOUS at 12:07

## 2024-05-24 RX ADMIN — LIDOCAINE HYDROCHLORIDE 50 MG: 20 INJECTION, SOLUTION INFILTRATION; PERINEURAL at 12:07

## 2024-05-24 RX ADMIN — SODIUM CHLORIDE, POTASSIUM CHLORIDE, SODIUM LACTATE AND CALCIUM CHLORIDE: 600; 310; 30; 20 INJECTION, SOLUTION INTRAVENOUS at 11:20

## 2024-05-24 ASSESSMENT — ACTIVITIES OF DAILY LIVING (ADL)
ADLS_ACUITY_SCORE: 35

## 2024-05-24 ASSESSMENT — LIFESTYLE VARIABLES: TOBACCO_USE: 0

## 2024-05-24 NOTE — DISCHARGE INSTRUCTIONS
[facility]    Home Care Following Endoscopy          Activity:    You have just undergone an endoscopic procedure performed with sedation.  Do not work or operate machinery (including a car) for at least 12 hours.      Diet:  Return to the diet you were on before your procedure but eat lightly for the first 12-24 hours.  Drink plenty of water.  Resume any regular medications unless otherwise advised by your physician.  Please begin any new medication prescribed as a result of your procedure as directed by your physician.   You had a biopsy or polyp removed. Please refrain from aspirin or aspirin products for 2 days.  If on Coumadin please restart as instructed by your physician.   Pain:    You may take Tylenol as needed for pain.  Expected Recovery:    You can expect some mild abdominal fullness and/or discomfort due to the air used to inflate your intestinal tract. I encourage you to walk and attempt to pass this air as soon as possible.    Call Your Physician if You Have:    After Colonoscopy:  Worsening persisting abdominal pain which is worse with activity.  Fevers (>101 degrees F), chills or shakes.  Passage of continued blood with bowel movements.     Any questions or concerns about your recovery, please call [phone] or after hours [facility] ([phone]) Nurse Advice Line.    Follow-up Care:  You did have polyps/biopsy tissue sample(s) removed.  The polyps/biopsy tissue sample(s) will be sent to pathology.    You should receive a letter in your My Chart from Dr. Gutierrez with your results within 1-2 weeks.   Please call if you have not received a notification of your results.  If asked to return to clinic please make an appointment 1 week after your procedure.  Call [phone].

## 2024-05-24 NOTE — ANESTHESIA CARE TRANSFER NOTE
Patient: Lawanda Kemp    Procedure: Procedure(s):  COLONOSCOPY, WITH POLYPECTOMY AND BIOPSY       Diagnosis: Colon cancer screening [Z12.11]  Diagnosis Additional Information: No value filed.    Anesthesia Type:   MAC     Note:    Oropharynx: oropharynx clear of all foreign objects and spontaneously breathing  Level of Consciousness: drowsy  Oxygen Supplementation: room air    Independent Airway: airway patency satisfactory and stable  Dentition: dentition unchanged  Vital Signs Stable: post-procedure vital signs reviewed and stable  Report to RN Given: handoff report given  Patient transferred to: Phase II    Handoff Report: Identifed the Patient, Identified the Reponsible Provider, Reviewed the pertinent medical history, Discussed the surgical course, Reviewed Intra-OP anesthesia mangement and issues during anesthesia, Set expectations for post-procedure period and Allowed opportunity for questions and acknowledgement of understanding  Vitals:  Vitals Value Taken Time   /81 05/24/24 1245   Temp     Pulse 72 05/24/24 1245   Resp     SpO2 100 % 05/24/24 1250   Vitals shown include unfiled device data.    Electronically Signed By: GRAHAM Rich CRNA  May 24, 2024  12:54 PM

## 2024-05-24 NOTE — ANESTHESIA POSTPROCEDURE EVALUATION
Patient: Lawanda Kemp    Procedure: Procedure(s):  COLONOSCOPY, WITH POLYPECTOMY AND BIOPSY       Anesthesia Type:  MAC    Note:  Disposition: Outpatient   Postop Pain Control: Uneventful            Sign Out: Well controlled pain   PONV: No   Neuro/Psych: Uneventful            Sign Out: Acceptable/Baseline neuro status   Airway/Respiratory: Uneventful            Sign Out: Acceptable/Baseline resp. status   CV/Hemodynamics: Uneventful            Sign Out: Acceptable CV status   Other NRE: NONE   DID A NON-ROUTINE EVENT OCCUR? No    Event details/Postop Comments:  Pt was happy with anesthesia care.  No complications.  I will follow up with the pt if needed.       Last vitals:  Vitals Value Taken Time   /81 05/24/24 1245   Temp     Pulse 72 05/24/24 1245   Resp     SpO2 100 % 05/24/24 1250   Vitals shown include unfiled device data.    Electronically Signed By: GRAHAM Rich CRNA  May 24, 2024  12:54 PM

## 2024-05-24 NOTE — ANESTHESIA PREPROCEDURE EVALUATION
Anesthesia Pre-Procedure Evaluation    Patient: Lawanda Kemp   MRN: [id number] : 1975        Procedure : Procedure(s):  Colonoscopy          Past Medical History:   Diagnosis Date    Anxiety and depression     HOCM (hypertrophic obstructive cardiomyopathy) (H)     Hypothyroidism     Migraine       Past Surgical History:   Procedure Laterality Date    breast reduction       SECTION      CHOLECYSTECTOMY      ELBOW SURGERY      LAPAROSCOPIC GASTRIC SLEEVE        Allergies   Allergen Reactions    Bee Venom Hives and Unknown    Benzoin Hives, Other (See Comments) and Rash     Blisters    Insect Extract Hives     Allergic to Bee's.    Phentermine-Topiramate Other (See Comments)     CP24   Parethesia    CP24    Parethesia    Adhesive Tape Blisters and Other (See Comments)     Liquid adhesive    Wound Dressing Adhesive      Liquid adhesive    MISC    Seasonal Allergies Other (See Comments)     Sinus issues and eyes itch and burn      Social History     Tobacco Use    Smoking status: Never    Smokeless tobacco: Never   Substance Use Topics    Alcohol use: Not on file      Wt Readings from Last 1 Encounters:   24 80.7 kg (178 lb)        Anesthesia Evaluation   Pt has had prior anesthetic. Type: MAC and General.    No history of anesthetic complications       ROS/MED HX  ENT/Pulmonary:    (-) tobacco use   Neurologic:     (+)      migraines,                          Cardiovascular:     (+)  hypertension- -   -  - -                                 Previous cardiac testing   Echo: Date: 24 Results:  Reading Physician Reading Date Result Priority  Marissa Daniels MD  [phone] 2024     Narrative & Impression  [id number]  [id number]  [id number]  [id number]^JAMIA^YELENA^LEESA     [facility]ch[facility]  [address]  [address]  [place] [address]  Phone [phone]  Fax [phone]     Name: DAISY  ALEX BRADLEY  MRN: [id number]  : 1975  Study Date: 2024 08:10 AM  Age: 48 yrs  Gender: Female  Patient Location: [facility]  Reason For Study: HOCM (hypertrophic obstructive cardiomyopathy) (H)  Ordering Physician: YELENA BLANDON  Referring Physician: YELENA BLANDON  Performed By: Janee Ortiz     BSA: 1.9 m2  Height: 64 in  Weight: 178 lb  HR: 70  BP: 121/85 mmHg  ______________________________________________________________________________  Procedure  Complete Echo Adult.     ______________________________________________________________________________  Interpretation Summary     1. Left ventricular systolic function is normal. The visual ejection fraction  is 60-65%.  2. No regional wall motion abnormalities noted.  3. The right ventricle is normal in structure, function and size.  4. There is mild (1+) mitral regurgitation.  ______________________________________________________________________________  Left Ventricle  The left ventricle is normal in size. There is mild concentric left  ventricular hypertrophy. Left ventricular systolic function is normal. The  visual ejection fraction is 60-65%. Grade I or early diastolic dysfunction. No  regional wall motion abnormalities noted.    Stress Test:  Date: Results:    ECG Reviewed:  Date: Results:    Cath:  Date: Results:      METS/Exercise Tolerance:     Hematologic:  - neg hematologic  ROS     Musculoskeletal:  - neg musculoskeletal ROS     GI/Hepatic:  - neg GI/hepatic ROS   (+)        bowel prep,            Renal/Genitourinary:  - neg Renal ROS     Endo:     (+)          thyroid problem, hypothyroidism,           Psychiatric/Substance Use:     (+) psychiatric history anxiety and depression       Infectious Disease:  - neg infectious disease ROS     Malignancy:  - neg malignancy ROS     Other:  - neg other ROS          Physical Exam    Airway        Mallampati: II   TM distance: > 3 FB   Neck ROM: full   Mouth opening: > 3  "cm    Respiratory Devices and Support         Dental           Cardiovascular   cardiovascular exam normal       Rhythm and rate: regular and normal     Pulmonary   pulmonary exam normal        breath sounds clear to auscultation           OUTSIDE LABS:  CBC:   Lab Results   Component Value Date    WBC 5.5 01/26/2024    HGB 15.0 01/26/2024    HCT 44.9 01/26/2024     01/26/2024     BMP:   Lab Results   Component Value Date     01/26/2024    POTASSIUM 4.9 01/26/2024    CHLORIDE 108 (H) 01/26/2024    CO2 27 01/26/2024    BUN 21.7 (H) 01/26/2024    CR 0.86 01/26/2024    GLC 92 01/26/2024     COAGS: No results found for: \"PTT\", \"INR\", \"FIBR\"  POC: No results found for: \"BGM\", \"HCG\", \"HCGS\"  HEPATIC:   Lab Results   Component Value Date    ALBUMIN 4.3 01/26/2024    PROTTOTAL 7.2 01/26/2024    ALT 16 01/26/2024    AST 21 01/26/2024    ALKPHOS 79 01/26/2024    BILITOTAL 0.3 01/26/2024     OTHER:   Lab Results   Component Value Date    A1C 5.1 01/26/2024    BILL 9.5 01/26/2024    TSH 6.16 (H) 01/26/2024    T4 0.90 01/26/2024       Anesthesia Plan    ASA Status:  2    NPO Status:  NPO Appropriate    Anesthesia Type: MAC.     - Reason for MAC: straight local not clinically adequate   Induction: Intravenous, Propofol.   Maintenance: TIVA.        Consents    Anesthesia Plan(s) and associated risks, benefits, and realistic alternatives discussed. Questions answered and patient/representative(s) expressed understanding.     - Discussed:     - Discussed with:  Patient      - Extended Intubation/Ventilatory Support Discussed: No.      - Patient is DNR/DNI Status: No     Use of blood products discussed: No .     Postoperative Care       PONV prophylaxis: Background Propofol Infusion     Comments:    Other Comments: The risks and benefits of anesthesia, and the alternatives where applicable, have been discussed with the patient, and they wish to proceed.              Bennett Moulton APRN CRNA    I have reviewed the " pertinent notes and labs in the chart from the past 30 days and (re)examined the patient.  Any updates or changes from those notes are reflected in this note.

## 2024-05-29 ENCOUNTER — MYC REFILL (OUTPATIENT)
Dept: FAMILY MEDICINE | Facility: CLINIC | Age: 49
End: 2024-05-29
Payer: COMMERCIAL

## 2024-05-29 DIAGNOSIS — F41.9 ANXIETY: ICD-10-CM

## 2024-05-29 DIAGNOSIS — F32.A DEPRESSION, UNSPECIFIED DEPRESSION TYPE: ICD-10-CM

## 2024-05-29 DIAGNOSIS — I42.1 HOCM (HYPERTROPHIC OBSTRUCTIVE CARDIOMYOPATHY) (H): ICD-10-CM

## 2024-05-29 DIAGNOSIS — E03.9 HYPOTHYROIDISM, UNSPECIFIED TYPE: ICD-10-CM

## 2024-05-29 LAB
PATH REPORT.COMMENTS IMP SPEC: NORMAL
PATH REPORT.COMMENTS IMP SPEC: NORMAL
PATH REPORT.FINAL DX SPEC: NORMAL
PATH REPORT.GROSS SPEC: NORMAL
PATH REPORT.MICROSCOPIC SPEC OTHER STN: NORMAL
PATH REPORT.RELEVANT HX SPEC: NORMAL
PHOTO IMAGE: NORMAL

## 2024-05-30 RX ORDER — BUSPIRONE HYDROCHLORIDE 15 MG/1
15 TABLET ORAL 2 TIMES DAILY
Qty: 180 TABLET | Refills: 0 | OUTPATIENT
Start: 2024-05-30

## 2024-05-30 RX ORDER — BUPROPION HYDROCHLORIDE 300 MG/1
TABLET ORAL
Qty: 90 TABLET | Refills: 0 | OUTPATIENT
Start: 2024-05-30

## 2024-05-30 RX ORDER — LEVOTHYROXINE SODIUM 88 UG/1
TABLET ORAL
Qty: 90 TABLET | Refills: 1 | OUTPATIENT
Start: 2024-05-30

## 2024-05-30 RX ORDER — VERAPAMIL HYDROCHLORIDE 120 MG/1
120 TABLET, FILM COATED, EXTENDED RELEASE ORAL AT BEDTIME
Qty: 90 TABLET | Refills: 0 | OUTPATIENT
Start: 2024-05-30

## 2024-06-07 ENCOUNTER — PATIENT OUTREACH (OUTPATIENT)
Dept: GASTROENTEROLOGY | Facility: CLINIC | Age: 49
End: 2024-06-07
Payer: COMMERCIAL

## 2024-06-12 DIAGNOSIS — F32.A DEPRESSION, UNSPECIFIED DEPRESSION TYPE: ICD-10-CM

## 2024-06-18 RX ORDER — BUPROPION HYDROCHLORIDE 300 MG/1
TABLET ORAL
Qty: 90 TABLET | Refills: 0 | Status: SHIPPED | OUTPATIENT
Start: 2024-06-18 | End: 2024-08-28

## 2024-06-25 ENCOUNTER — E-VISIT (OUTPATIENT)
Dept: FAMILY MEDICINE | Facility: CLINIC | Age: 49
End: 2024-06-25
Payer: COMMERCIAL

## 2024-06-25 DIAGNOSIS — F32.A DEPRESSION, UNSPECIFIED DEPRESSION TYPE: Primary | ICD-10-CM

## 2024-06-25 PROCEDURE — 99207 PR NON-BILLABLE SERV PER CHARTING: CPT | Performed by: FAMILY MEDICINE

## 2024-06-25 ASSESSMENT — ANXIETY QUESTIONNAIRES
7. FEELING AFRAID AS IF SOMETHING AWFUL MIGHT HAPPEN: MORE THAN HALF THE DAYS
7. FEELING AFRAID AS IF SOMETHING AWFUL MIGHT HAPPEN: MORE THAN HALF THE DAYS
GAD7 TOTAL SCORE: 11
1. FEELING NERVOUS, ANXIOUS, OR ON EDGE: MORE THAN HALF THE DAYS
6. BECOMING EASILY ANNOYED OR IRRITABLE: SEVERAL DAYS
GAD7 TOTAL SCORE: 11
3. WORRYING TOO MUCH ABOUT DIFFERENT THINGS: MORE THAN HALF THE DAYS
2. NOT BEING ABLE TO STOP OR CONTROL WORRYING: MORE THAN HALF THE DAYS
GAD7 TOTAL SCORE: 11
IF YOU CHECKED OFF ANY PROBLEMS ON THIS QUESTIONNAIRE, HOW DIFFICULT HAVE THESE PROBLEMS MADE IT FOR YOU TO DO YOUR WORK, TAKE CARE OF THINGS AT HOME, OR GET ALONG WITH OTHER PEOPLE: SOMEWHAT DIFFICULT
5. BEING SO RESTLESS THAT IT IS HARD TO SIT STILL: NOT AT ALL
4. TROUBLE RELAXING: MORE THAN HALF THE DAYS
8. IF YOU CHECKED OFF ANY PROBLEMS, HOW DIFFICULT HAVE THESE MADE IT FOR YOU TO DO YOUR WORK, TAKE CARE OF THINGS AT HOME, OR GET ALONG WITH OTHER PEOPLE?: SOMEWHAT DIFFICULT

## 2024-06-25 ASSESSMENT — PATIENT HEALTH QUESTIONNAIRE - PHQ9
SUM OF ALL RESPONSES TO PHQ QUESTIONS 1-9: 8
SUM OF ALL RESPONSES TO PHQ QUESTIONS 1-9: 8
10. IF YOU CHECKED OFF ANY PROBLEMS, HOW DIFFICULT HAVE THESE PROBLEMS MADE IT FOR YOU TO DO YOUR WORK, TAKE CARE OF THINGS AT HOME, OR GET ALONG WITH OTHER PEOPLE: SOMEWHAT DIFFICULT

## 2024-06-25 NOTE — PATIENT INSTRUCTIONS
Thank you for choosing us for your care. I think an in-clinic or virtual visit would be the best next step based on your symptoms. Please schedule a clinic appointment; you won t be charged for this eVisit.      You can schedule an appointment by clicking here in [email or web address], or call [phone].

## 2024-06-26 ASSESSMENT — PATIENT HEALTH QUESTIONNAIRE - PHQ9: SUM OF ALL RESPONSES TO PHQ QUESTIONS 1-9: 8

## 2024-07-19 DIAGNOSIS — I42.1 HOCM (HYPERTROPHIC OBSTRUCTIVE CARDIOMYOPATHY) (H): ICD-10-CM

## 2024-07-25 RX ORDER — VERAPAMIL HYDROCHLORIDE 120 MG/1
TABLET, FILM COATED, EXTENDED RELEASE ORAL
Qty: 90 TABLET | Refills: 0 | Status: SHIPPED | OUTPATIENT
Start: 2024-07-25 | End: 2024-08-23

## 2024-07-26 NOTE — PROGRESS NOTES
"Chief complaint: Hypertrophic cardiomyopathy    HPI:   Lawanda Kemp is a 48 year old female with history of HCM who presents for follow up of HCM.    She is from the [facility] [place]. She lived in [place] years and was previously followed at [facility], where she established after her PCP in [place] heard a murmur. Her family is in [place], [place] and she moved back several years ago.     She walks regularly (~2 miles/day) and just got a new labrador puppy. She was somewhat more active in [place] and has gained some weight. She does have some difficulty with stairs and slows down going up stairs. She would like to move back to the [place] and is trying to sell her house in [place].     She had 1 episode of syncope in 2022 during her son's Karate practice. 2 episodes in , 1 while going to the bathroom and 1 while at work. All of these episodes occurred at rest and were preceded by a typical vagal prodrome (lightheadedness, cold sweats, tunnel vision) and antidrome.     She takes verapamil 120 mg QPM. She has never been on a higher dose.     Both of her sons had echocardiograms after her initial diagnosis.    She denies any chest pain, dyspnea at rest or with exertion, PND, orthopnea, peripheral edema, palpitations, lightheadedness or syncope.     Family history:  She has 2 sons, ages 15 and 16. Her mother  at 70 of lung cancer; she also had breast cancer and had a pacemaker. Her father is alive and has CAD and a 4-vessel CABG.   Multiple maternal relatives (maternal grandmother, aunts, uncles)  of \"heart attacks\" in their 60s; the details  of these are unclear.   1 maternal cousin  at 47 of a heart attack; she had 2 prior MIs before this.        PAST MEDICAL HISTORY:  HCM per HPI  Hypothyroidism  Migraine    Exam:  /89 (BP Location: Right arm, Patient Position: Chair, Cuff Size: Adult Regular)   Pulse 67   Wt 82.6 kg (182 lb 1.6 oz)   SpO2 94%   BMI " 31.26 kg/m    GENERAL APPEARANCE: healthy, alert and no distress  EYES: no icterus, no xanthelasmas  ENT: normal palate, mucosa moist, no central cyanosis  NECK: JVP not elevated  RESPIRATORY: lungs clear to auscultation - no rales, rhonchi or wheezes, no use of accessory muscles, no retractions, respirations are unlabored, normal respiratory rate  CARDIOVASCULAR: regular rhythm, +2/6 systolic murmur audible only with Valsalva   GI: soft, non tender, bowel sounds normal,no abdominal bruits  EXTREMITIES: no edema, no bruits  NEURO: alert and oriented to person/place/time, normal speech, gait and affect  VASC: Radial, dorsalis pedis and posterior tibialis pulses 2+ bilaterally.  SKIN: no ecchymoses, no rashes.  PSYCH: cooperative, affect appropriate.     Labs:  Reviewed.         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 No fibrosis on LGE.     ECG 07/30/24: sinus with LVH and left axis deviation, VR 62    TTE 4/9/24: Known HCM better characterized on 1/2024 CMR. GRECIA without septal contact. No LVOT gradient detected at rest or with Valsalva.    Stress echocardiogram 6/22/23 ([facility]):   Exercised 7:32 on the Holger protocol.   GRECIA with septal contact at rest which increases with exercise.   Valsalva LVOT gradient ~48 mmHg  Post exercise LVOT gradient has significant MR signal contamination, difficult to assess.    Outside results of note:  Outside records from [facility] and [facility] were obtained, and relevant results/notes have been incorporated into HPI.    30-day event monitor 5/26/23 ([facility]):  1. Monitor showed predominately: normal sinus rhythm   2. Tachycardia: 5 % of the time   3. Bradycardia: 10 % of the time    4. Ventricular ectopy: rare (<1%) PVC present.   5. Supraventricular ectopy: rare (<1%) PAC present.   6. Atrial fibrillation: none   7. Patient triggered events: accidental push was associated with   sinus rhythm; 1 episodes.   8. Auto triggered events: sinus rhythm; 1 episodes   9. Critical Alert: None     CMR 9/30/21 ([facility]):  1.  Hypertrophic obstructive cardiomyopathy.  The basal anteroseptal wall measures up to 15 mm.  There is systolic anterior motion of the mitral valve.  There is papillary muscle hypertrophy.   2. No late gadolinium enhancement seen.    3. No right ventricular cavity size, wall thickness and global systolic function.  Calculated RVEF: 63%.   4. Small hiatal hernia.     Exercise echocardiogram 6/22/23 ([facility]):  Significant inducible LVOT gradient post-exercise was noted.   The resting left ventricular outflow peak gradient is 13 mm Hg. (Peak V : 1.8 m/sec)   The inducible left ventricular outflow peak gradient (Valsalva) is 60 mm Hg. (Peak V : 3.9 m/sec)   The inducible left ventricular outflow peak gradient (post-exercise) is 171 mmHg (Peak V : 6.5 m/sec)   No regional wall motion abnormalities and normal hyperdynamic function noted with stress.   Average exercise tolerance (exercised for 7:32 min to Stage III of the Holger protocol, achieving 8 METS) and stopped due to fatigue.   Baseline EKG with normal sinus rhythm and no diagnostic ST changes with stress.   Kendrick treadmill score +7 (low risk).       Assessment and Plan:   HCM with LVOTO (HOCM/oHCM)  The patient was counseled at length regarding the nature of hypertrophic cardiomyopathy including its genetic nature, its natural history, and potential complications.     We discussed the potential complications of HCM, including outflow obstruction, arrhythmias, and heart failure.  Lawanda has mild (possibly moderate) LVOT obstruction based on her last exercise echocardiogram in 2023 (the true post-exercise LVOT  "gradient is difficult to assess on that study as discussed above.) She patient was counseled regarding behavioral interventions to avoid worsening outflow obstruction, in particular avoiding dehydration and minimizing diuretics and peripheral vasodilators.      Lawanda has no identified high-risk features for sudden death. In particular, sh does not have significant LGE burden on her cardiac MRI (no LGE). Maximal LV wall thickness is ~1.6 cm. She also has no family history of SCD in 1st-degree relatives (though she does have multiple 2nd degree relatives who  of \"heart attacks,\" the details of which are unclear.) Event monitor from  did not show any ventricular arrhythmias. Lawanda has no history of unexplained syncope but has had 3 episodes of neurocardiogenic syncope with typical prodrome.      Lawanda has mild functional limitation. It is unclear the extent to which this is related to LVOTO versus deconditioning. I did offer a trial of increased verapamil, which Emma preferred to defer. I recommended she try to gradually increase moderate-intensity aerobic exercise as tolerated. We discussed the activity recommendations for hypertrophic cardiomyopathy, and in particular that moderate intensity aerobic exercise is encouraged and that only the highest intensity competitive sports with start-stop activity (e.g. basketball and ice hockey) are felt to be contraindicated.      We also discussed the genetic nature of HCM and its generally autosomal dominant inheritance. We counseled that all 1st-degree relatives should be screened and explained the strategies of clinical screening and genetic testing. Lawanda had genetic testing through [facility] in  which did not show any HCM-related mutations (it did incidentally show she is a carrier for autosomal recessive Pompe disease.) Her son's have been screened once clinically several years ago. I discussed that clinical screening should be repeated periodically " in 1st-degree relatives when cascade genetic testing is not possible and recommended her sons be re-screened (likely every 1-2 years given their current ages.)     Plan in brief:  -offered to increase verapamil, she would prefer to remain at the current dose and attempt to lose weight and increase her exercise  -HCM protocol CPX/stress echo in 1 year  -14-day ZioPatch or other ambulatory ECG 1 year  -follow-up with me in 1 year after completion of the above  -recommended her sons be re-screened clinically (will be 16 and 17 next year)  -CMR ~2026 (1 year after last)    The patient states understanding and is agreeable with plan.   I spent a total of 82 minutes on the day of the visit.   Time spent by me doing chart review, history and exam, documentation and further activities per the note.  The longitudinal plan of care for the diagnosis(es)/condition(s) as documented were addressed during this visit. Due to the added complexity in care, I will continue to support Lawanda in the subsequent management and with ongoing continuity of care.    Yelena Blandon MD  Cardiology    CC  YELENA BLANDON

## 2024-07-30 ENCOUNTER — OFFICE VISIT (OUTPATIENT)
Dept: CARDIOLOGY | Facility: CLINIC | Age: 49
End: 2024-07-30
Attending: INTERNAL MEDICINE
Payer: COMMERCIAL

## 2024-07-30 ENCOUNTER — LAB (OUTPATIENT)
Dept: LAB | Facility: CLINIC | Age: 49
End: 2024-07-30
Payer: COMMERCIAL

## 2024-07-30 VITALS
DIASTOLIC BLOOD PRESSURE: 89 MMHG | SYSTOLIC BLOOD PRESSURE: 126 MMHG | BODY MASS INDEX: 31.26 KG/M2 | OXYGEN SATURATION: 94 % | WEIGHT: 182.1 LBS | HEART RATE: 67 BPM

## 2024-07-30 DIAGNOSIS — I42.1 HOCM (HYPERTROPHIC OBSTRUCTIVE CARDIOMYOPATHY) (H): Primary | ICD-10-CM

## 2024-07-30 DIAGNOSIS — I42.1 HOCM (HYPERTROPHIC OBSTRUCTIVE CARDIOMYOPATHY) (H): ICD-10-CM

## 2024-07-30 LAB
ALBUMIN SERPL BCG-MCNC: 4.4 G/DL (ref 3.5–5.2)
ALP SERPL-CCNC: 71 U/L (ref 40–150)
ALT SERPL W P-5'-P-CCNC: 22 U/L (ref 0–50)
ANION GAP SERPL CALCULATED.3IONS-SCNC: 9 MMOL/L (ref 7–15)
AST SERPL W P-5'-P-CCNC: 24 U/L (ref 0–45)
BASOPHILS # BLD AUTO: 0.1 10E3/UL (ref 0–0.2)
BASOPHILS NFR BLD AUTO: 1 %
BILIRUB SERPL-MCNC: 0.4 MG/DL
BUN SERPL-MCNC: 14.5 MG/DL (ref 6–20)
CALCIUM SERPL-MCNC: 10.2 MG/DL (ref 8.8–10.4)
CHLORIDE SERPL-SCNC: 106 MMOL/L (ref 98–107)
CHOLEST SERPL-MCNC: 227 MG/DL
CREAT SERPL-MCNC: 0.94 MG/DL (ref 0.51–0.95)
EGFRCR SERPLBLD CKD-EPI 2021: 74 ML/MIN/1.73M2
EOSINOPHIL # BLD AUTO: 0.1 10E3/UL (ref 0–0.7)
EOSINOPHIL NFR BLD AUTO: 2 %
ERYTHROCYTE [DISTWIDTH] IN BLOOD BY AUTOMATED COUNT: 13.2 % (ref 10–15)
FASTING STATUS PATIENT QL REPORTED: YES
FASTING STATUS PATIENT QL REPORTED: YES
GLUCOSE SERPL-MCNC: 88 MG/DL (ref 70–99)
HCO3 SERPL-SCNC: 27 MMOL/L (ref 22–29)
HCT VFR BLD AUTO: 44.1 % (ref 35–47)
HDLC SERPL-MCNC: 73 MG/DL
HGB BLD-MCNC: 14.9 G/DL (ref 11.7–15.7)
IMM GRANULOCYTES # BLD: 0 10E3/UL
IMM GRANULOCYTES NFR BLD: 0 %
LDLC SERPL CALC-MCNC: 140 MG/DL
LYMPHOCYTES # BLD AUTO: 1.8 10E3/UL (ref 0.8–5.3)
LYMPHOCYTES NFR BLD AUTO: 33 %
MCH RBC QN AUTO: 30 PG (ref 26.5–33)
MCHC RBC AUTO-ENTMCNC: 33.8 G/DL (ref 31.5–36.5)
MCV RBC AUTO: 89 FL (ref 78–100)
MONOCYTES # BLD AUTO: 0.4 10E3/UL (ref 0–1.3)
MONOCYTES NFR BLD AUTO: 7 %
NEUTROPHILS # BLD AUTO: 3 10E3/UL (ref 1.6–8.3)
NEUTROPHILS NFR BLD AUTO: 57 %
NONHDLC SERPL-MCNC: 154 MG/DL
NRBC # BLD AUTO: 0 10E3/UL
NRBC BLD AUTO-RTO: 0 /100
NT-PROBNP SERPL-MCNC: 214 PG/ML (ref 0–450)
PLATELET # BLD AUTO: 234 10E3/UL (ref 150–450)
POTASSIUM SERPL-SCNC: 4.7 MMOL/L (ref 3.4–5.3)
PROT SERPL-MCNC: 7.3 G/DL (ref 6.4–8.3)
RBC # BLD AUTO: 4.96 10E6/UL (ref 3.8–5.2)
SODIUM SERPL-SCNC: 142 MMOL/L (ref 135–145)
TRIGL SERPL-MCNC: 71 MG/DL
WBC # BLD AUTO: 5.3 10E3/UL (ref 4–11)

## 2024-07-30 PROCEDURE — 93010 ELECTROCARDIOGRAM REPORT: CPT | Performed by: INTERNAL MEDICINE

## 2024-07-30 PROCEDURE — 83880 ASSAY OF NATRIURETIC PEPTIDE: CPT | Performed by: PATHOLOGY

## 2024-07-30 PROCEDURE — 80061 LIPID PANEL: CPT | Performed by: PATHOLOGY

## 2024-07-30 PROCEDURE — 36415 COLL VENOUS BLD VENIPUNCTURE: CPT | Performed by: PATHOLOGY

## 2024-07-30 PROCEDURE — G2211 COMPLEX E/M VISIT ADD ON: HCPCS | Performed by: INTERNAL MEDICINE

## 2024-07-30 PROCEDURE — 85025 COMPLETE CBC W/AUTO DIFF WBC: CPT | Performed by: PATHOLOGY

## 2024-07-30 PROCEDURE — 99417 PROLNG OP E/M EACH 15 MIN: CPT | Performed by: INTERNAL MEDICINE

## 2024-07-30 PROCEDURE — 80053 COMPREHEN METABOLIC PANEL: CPT | Performed by: PATHOLOGY

## 2024-07-30 PROCEDURE — 99205 OFFICE O/P NEW HI 60 MIN: CPT | Performed by: INTERNAL MEDICINE

## 2024-07-30 PROCEDURE — G0463 HOSPITAL OUTPT CLINIC VISIT: HCPCS | Performed by: INTERNAL MEDICINE

## 2024-07-30 PROCEDURE — 93005 ELECTROCARDIOGRAM TRACING: CPT

## 2024-07-30 ASSESSMENT — PAIN SCALES - GENERAL: PAINLEVEL: NO PAIN (0)

## 2024-07-30 NOTE — PATIENT INSTRUCTIONS
You were seen today in the Adult Congenital and Cardiovascular Genetics Clinic at the [facility].    Cardiology Providers you saw during your visit:  Kevin Garcia MD    Diagnosis:  HOCM    Results:  Kevin Garcia MD reviewed the results of your Echo, EKG, and labs testing today in clinic.    Recommendations for you:    No changes today  Please increase your physical activity  Cardiology screening for both your sones next year with Dr. Allen or Sun Galaviz with an echocardiogram prior.       General Cardiac Recommendations:  Continue to eat a heart healthy, low salt diet.  Continue to get 20-30 minutes of aerobic activity, 4-5 days per week.  Examples of aerobic activity include walking, running, swimming, cycling, etc.  Continue to observe good oral hygiene, with regular dental visits.      SBE prophylaxis:   Yes____  No_x___    Exercise restrictions:   Yes__X__  No____         If yes, list restrictions:  Must be allowed to rest if fatigued or SOB      FASTING CHOLESTEROL was checked in the last 5 years YES__x__  NO____ (2024)  If no, please follow up with your primary care physician. You should have a cholesterol screening every 5 years.      Follow-up:  Follow up with Dr. Garcia in 1 year with CPX and stress echo and heart monitor. Will find a hypoallergic option for monitor.     CardioPulmonary Stress Test (CPX)  CPX is a maximal (meaning you exercise to exhaustion, not to achieve a heart rate) exercise test where we measure how well your heart/body uses oxygen or energy. It is the gold standard for measuring functional capacity and helps us differentiate limitations due to lungs, heart, or fitness.   A CPX is NOT a typical stress test. You will NOT be asked to hold your Beta Blocker medication.   You will be scheduled for a CardioPulmonary Stress Test at the [facility] ([address], [facility] [address], [phone]).  Follow these instructions:    1.  Report to the GOLD waiting room in the [facility].  2. Nothing to eat for 3 hours prior to your test. You may have clear liquids up to the time of your test.  3. Please wear loose, two-piece clothing and comfortable, rubber-soled shoes for walking.       What happens during this test?   We will place a blood pressure cuff on your arm. We will also attach small pads to your chest. The pads are hooked to an EKG (electrocardiogram) machine that shows how your heart is working.  You will walk on a treadmill or pedal a bicycle.  You will breathe through a mouthpiece, and the air you breathe out will be measured at rest and during exercise.  We will watch your EKG, heart rate and heart rhythm the entire time.  We will check your blood pressure during the test.  This test takes two (2) hours.      For Patients with Diabetes: Your RN Coordinator will give you instructions on adjusting your diabetic medications for the day of your test                           If you have questions please contact your diabetic care team.                          Remember to  bring your glucometer & insulin with you to take after your test if needed.     Pre-procedure instructions - Echocardiogram Exercise Stress  Patient Education      1. Please bring or wear a comfortable two-piece outfit and walking shoes.   2. Stop eating 3 hours before the test. You may drink water or juice.   3. Stop all caffeine 12 hours before the test. This includes coffee, tea, soda pop, chocolate and certain medicines (such as Anacin and Excederin). Also avoid decaf coffee and tea, as these contain small amounts of caffeine.   4. No alcohol, smoking or use of other tobacco products for 12 hours before the test.   5. Refer to your provider instructions to see if you need to stop any medications (such as beta-blockers or nitrates) for this test.   6. For patients with diabetes: - If you take insulin, call your diabetes care team. Ask if you should take a   dose  the morning of your test. - If you take diabetes medicine by mouth, dont take it on the morning of your test. Bring it with you to take after the test. (If you have questions, call your diabetes care team)   7. Please do not apply perfumes or lotions on the day of your exam.   8. When you arrive, please tell us if: - You have diabetes. - You have taken Viagra, Cialis or Levitra in the past 48 hours.       What Should I wear? Please bring or wear a comfortable two-piece outfit and walking shoes     How long does the Exam Take? Most tests take up to 90 minutes.       Do I need a ? No     What Should I do after the Exam? Nothing needs to be done after the exam.     What is this test? An echocardiogram (echo) is a test used to assess the heart's function and structures. A stress echocardiogram is a test done to assess how well the heart works under stress. The  stress  can be triggered by either exercise on a treadmill or a medicine called dobutamine     If you have questions or concerns please contact us at:    John Kelly RN, BSN     Mariaa Green and Tanesha Arciniega (Scheduling)  Nurse Care Coordinator     Clinic   CV Genetics      Adult Congenital and CV Genetic  [facility]  (P) [phone]     (P) [phone]  (F) [phone]     (F) [phone]      For after hours urgent needs, call [phone] and ask to speak to the Adult Congenital Physician on call.  Mention Job Code 0401.    For emergencies call 911.    [facility]  [facility]  Mail Code [id number]  [address] [place]  [address]

## 2024-07-30 NOTE — LETTER
"2024      RE: Lawanda Kemp  [facility]  [place] [address]       Dear Colleague,    Thank you for the opportunity to participate in the care of your patient, Lawanda Kemp, at the [facility] [place] [facility]. Please see a copy of my visit note below.    Chief complaint: Hypertrophic cardiomyopathy    HPI:   Lawanda Kemp is a 48 year old female with history of HCM who presents for follow up of HCM.    She is from the [place]. She lived in [place] years and was previously followed at [facility], where she established after her PCP in [place] heard a murmur. Her family is in [place], [place] and she moved back several years ago.     She walks regularly (~2 miles/day) and just got a new labrador puppy. She was somewhat more active in [place] and has gained some weight. She does have some difficulty with stairs and slows down going up stairs. She would like to move back to the [place] and is trying to sell her house in [place].     She had 1 episode of syncope in 2022 during her son's Karate practice. 2 episodes in , 1 while going to the bathroom and 1 while at work. All of these episodes occurred at rest and were preceded by a typical vagal prodrome (lightheadedness, cold sweats, tunnel vision) and antidrome.     She takes verapamil 120 mg QPM. She has never been on a higher dose.     Both of her sons had echocardiograms after her initial diagnosis.    She denies any chest pain, dyspnea at rest or with exertion, PND, orthopnea, peripheral edema, palpitations, lightheadedness or syncope.     Family history:  She has 2 sons, ages 15 and 16. Her mother  at 70 of lung cancer; she also had breast cancer and had a pacemaker. Her father is alive and has CAD and a 4-vessel CABG.   Multiple maternal relatives (maternal grandmother, aunts, uncles)  of \"heart attacks\" in their " 60s; the details  of these are unclear.   1 maternal cousin  at 47 of a heart attack; she had 2 prior MIs before this.        PAST MEDICAL HISTORY:  HCM per HPI  Hypothyroidism  Migraine    Exam:  /89 (BP Location: Right arm, Patient Position: Chair, Cuff Size: Adult Regular)   Pulse 67   Wt 82.6 kg (182 lb 1.6 oz)   SpO2 94%   BMI 31.26 kg/m    GENERAL APPEARANCE: healthy, alert and no distress  EYES: no icterus, no xanthelasmas  ENT: normal palate, mucosa moist, no central cyanosis  NECK: JVP not elevated  RESPIRATORY: lungs clear to auscultation - no rales, rhonchi or wheezes, no use of accessory muscles, no retractions, respirations are unlabored, normal respiratory rate  CARDIOVASCULAR: regular rhythm, +2/6 systolic murmur audible only with Valsalva   GI: soft, non tender, bowel sounds normal,no abdominal bruits  EXTREMITIES: no edema, no bruits  NEURO: alert and oriented to person/place/time, normal speech, gait and affect  VASC: Radial, dorsalis pedis and posterior tibialis pulses 2+ bilaterally.  SKIN: no ecchymoses, no rashes.  PSYCH: cooperative, affect appropriate.     Labs:  Reviewed.         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 No fibrosis on LGE.     ECG 24: sinus with LVH and left axis deviation, VR 62    TTE 24: Known HCM better characterized on 2024 CMR. GRECIA without septal contact. No LVOT gradient detected at rest or with Valsalva.    Stress echocardiogram 23 ([facility]):   Exercised 7:32 on the Holger protocol.   GRECIA with septal contact at rest which increases with exercise.   Valsalva LVOT gradient ~48 mmHg  Post exercise LVOT gradient has significant MR signal contamination,  difficult to assess.    Outside results of note:  Outside records from [facility] and [facility] were obtained, and relevant results/notes have been incorporated into HPI.    30-day event monitor 5/26/23 ([facility]):  1. Monitor showed predominately: normal sinus rhythm   2. Tachycardia: 5 % of the time   3. Bradycardia: 10 % of the time   4. Ventricular ectopy: rare (<1%) PVC present.   5. Supraventricular ectopy: rare (<1%) PAC present.   6. Atrial fibrillation: none   7. Patient triggered events: accidental push was associated with   sinus rhythm; 1 episodes.   8. Auto triggered events: sinus rhythm; 1 episodes   9. Critical Alert: None     CMR 9/30/21 ([facility]):  1.  Hypertrophic obstructive cardiomyopathy.  The basal anteroseptal wall measures up to 15 mm.  There is systolic anterior motion of the mitral valve.  There is papillary muscle hypertrophy.   2. No late gadolinium enhancement seen.    3. No right ventricular cavity size, wall thickness and global systolic function.  Calculated RVEF: 63%.   4. Small hiatal hernia.     Exercise echocardiogram 6/22/23 ([facility]):  Significant inducible LVOT gradient post-exercise was noted.   The resting left ventricular outflow peak gradient is 13 mm Hg. (Peak V : 1.8 m/sec)   The inducible left ventricular outflow peak gradient (Valsalva) is 60 mm Hg. (Peak V : 3.9 m/sec)   The inducible left ventricular outflow peak gradient (post-exercise) is 171 mmHg (Peak V : 6.5 m/sec)   No regional wall motion abnormalities and normal hyperdynamic function noted with stress.   Average exercise tolerance (exercised for 7:32 min to Stage III of the Holger protocol, achieving 8 METS) and stopped due to fatigue.   Baseline EKG with normal sinus rhythm and no diagnostic ST changes with stress.   Kendrick treadmill score +7 (low risk).       Assessment and Plan:   HCM with LVOTO (HOCM/oHCM)  The patient was counseled at length regarding the nature of hypertrophic  "cardiomyopathy including its genetic nature, its natural history, and potential complications.     We discussed the potential complications of HCM, including outflow obstruction, arrhythmias, and heart failure.  Lawanda has mild (possibly moderate) LVOT obstruction based on her last exercise echocardiogram in  (the true post-exercise LVOT gradient is difficult to assess on that study as discussed above.) She patient was counseled regarding behavioral interventions to avoid worsening outflow obstruction, in particular avoiding dehydration and minimizing diuretics and peripheral vasodilators.      Lawanda has no identified high-risk features for sudden death. In particular, sh does not have significant LGE burden on her cardiac MRI (no LGE). Maximal LV wall thickness is ~1.6 cm. She also has no family history of SCD in 1st-degree relatives (though she does have multiple 2nd degree relatives who  of \"heart attacks,\" the details of which are unclear.) Event monitor from  did not show any ventricular arrhythmias. Lawanda has no history of unexplained syncope but has had 3 episodes of neurocardiogenic syncope with typical prodrome.      Lawanda has mild functional limitation. It is unclear the extent to which this is related to LVOTO versus deconditioning. I did offer a trial of increased verapamil, which Emma preferred to defer. I recommended she try to gradually increase moderate-intensity aerobic exercise as tolerated. We discussed the activity recommendations for hypertrophic cardiomyopathy, and in particular that moderate intensity aerobic exercise is encouraged and that only the highest intensity competitive sports with start-stop activity (e.g. basketball and ice hockey) are felt to be contraindicated.      We also discussed the genetic nature of HCM and its generally autosomal dominant inheritance. We counseled that all 1st-degree relatives should be screened and explained the strategies of clinical " screening and genetic testing. Lawanda had genetic testing through [facility] in 2021 which did not show any HCM-related mutations (it did incidentally show she is a carrier for autosomal recessive Pompe disease.) Her son's have been screened once clinically several years ago. I discussed that clinical screening should be repeated periodically in 1st-degree relatives when cascade genetic testing is not possible and recommended her sons be re-screened (likely every 1-2 years given their current ages.)     Plan in brief:  -offered to increase verapamil, she would prefer to remain at the current dose and attempt to lose weight and increase her exercise  -HCM protocol CPX/stress echo in 1 year  -14-day ZioPatch or other ambulatory ECG 1 year  -follow-up with me in 1 year after completion of the above  -recommended her sons be re-screened clinically (will be 16 and 17 next year)  -CMR ~2026 (1 year after last)    The patient states understanding and is agreeable with plan.   I spent a total of 82 minutes on the day of the visit.   Time spent by me doing chart review, history and exam, documentation and further activities per the note.  The longitudinal plan of care for the diagnosis(es)/condition(s) as documented were addressed during this visit. Due to the added complexity in care, I will continue to support Lawanda in the subsequent management and with ongoing continuity of care.    Yelena Blandon MD  Cardiology    CC  YELENA BLANDON          Please do not hesitate to contact me if you have any questions/concerns.     Sincerely,     Yelena Blandon MD

## 2024-07-30 NOTE — NURSING NOTE
Cardiac Testing: Patient given instructions regarding  stress echocardiogram . Discussed purpose, preparation, procedure and when to expect results reported back to the patient. Patient demonstrated understanding of this information and agreed to call with further questions or concerns.     Return Appointment: Patient given instructions regarding scheduling next clinic visit. Patient demonstrated understanding of this information and agreed to call with further questions or concerns. Follow up with Dr. Garcia in 1 year with CPX and stress echo and heart monitor.     Patient stated she understood all health information given and agreed to call with further questions or concerns.     John Kelly RN

## 2024-07-30 NOTE — NURSING NOTE
Chief Complaint   Patient presents with    New Patient     48 year old female with history of HOCM, carrier for autosomal recessive Pompe disease presenting for evaluation       Vitals were taken, medications reconciled. EKG was performed.     Kel Tate, Clinic Assistant   7:56 AM

## 2024-08-01 LAB
ATRIAL RATE - MUSE: 62 BPM
DIASTOLIC BLOOD PRESSURE - MUSE: NORMAL MMHG
INTERPRETATION ECG - MUSE: NORMAL
P AXIS - MUSE: 25 DEGREES
PR INTERVAL - MUSE: 150 MS
QRS DURATION - MUSE: 104 MS
QT - MUSE: 436 MS
QTC - MUSE: 442 MS
R AXIS - MUSE: -40 DEGREES
SYSTOLIC BLOOD PRESSURE - MUSE: NORMAL MMHG
T AXIS - MUSE: 38 DEGREES
VENTRICULAR RATE- MUSE: 62 BPM

## 2024-08-23 DIAGNOSIS — F41.9 ANXIETY: ICD-10-CM

## 2024-08-23 DIAGNOSIS — F32.A DEPRESSION, UNSPECIFIED DEPRESSION TYPE: ICD-10-CM

## 2024-08-23 DIAGNOSIS — I42.1 HOCM (HYPERTROPHIC OBSTRUCTIVE CARDIOMYOPATHY) (H): ICD-10-CM

## 2024-08-23 DIAGNOSIS — E03.9 HYPOTHYROIDISM, UNSPECIFIED TYPE: ICD-10-CM

## 2024-08-23 RX ORDER — VERAPAMIL HYDROCHLORIDE 120 MG/1
TABLET, FILM COATED, EXTENDED RELEASE ORAL
Qty: 90 TABLET | Refills: 0 | Status: SHIPPED | OUTPATIENT
Start: 2024-08-23

## 2024-08-26 RX ORDER — BUPROPION HYDROCHLORIDE 300 MG/1
TABLET ORAL
Qty: 90 TABLET | Refills: 0 | OUTPATIENT
Start: 2024-08-26

## 2024-08-28 RX ORDER — BUPROPION HYDROCHLORIDE 300 MG/1
TABLET ORAL
Qty: 90 TABLET | Refills: 0 | Status: SHIPPED | OUTPATIENT
Start: 2024-08-28

## 2024-08-28 RX ORDER — LEVOTHYROXINE SODIUM 88 UG/1
TABLET ORAL
Qty: 90 TABLET | Refills: 1 | Status: SHIPPED | OUTPATIENT
Start: 2024-08-28

## 2024-08-28 RX ORDER — BUSPIRONE HYDROCHLORIDE 15 MG/1
15 TABLET ORAL 2 TIMES DAILY
Qty: 180 TABLET | Refills: 0 | Status: SHIPPED | OUTPATIENT
Start: 2024-08-28

## 2025-03-16 ENCOUNTER — HEALTH MAINTENANCE LETTER (OUTPATIENT)
Age: 50
End: 2025-03-16

## 2025-07-20 ENCOUNTER — HEALTH MAINTENANCE LETTER (OUTPATIENT)
Age: 50
End: 2025-07-20

## (undated) DEVICE — TUBING SUCTION 6"X3/16" N56A

## (undated) DEVICE — ENDO SNARE EXACTO COLD 9MM LOOP 2.4MMX230CM 00711115

## (undated) DEVICE — ENDO TRAP POLYP E-TRAP 00711099

## (undated) DEVICE — KIT ENDO TURNOVER/PROCEDURE CARRY-ON 101822

## (undated) DEVICE — SOL WATER IRRIG 1000ML BOTTLE 2F7114